# Patient Record
Sex: FEMALE | URBAN - METROPOLITAN AREA
[De-identification: names, ages, dates, MRNs, and addresses within clinical notes are randomized per-mention and may not be internally consistent; named-entity substitution may affect disease eponyms.]

---

## 2021-12-01 ENCOUNTER — OFFICE VISIT (OUTPATIENT)
Dept: URBAN - METROPOLITAN AREA CLINIC 56 | Facility: CLINIC | Age: 66
End: 2021-12-01
Payer: MEDICARE

## 2021-12-01 DIAGNOSIS — H43.391 OTHER VITREOUS OPACITIES, RIGHT EYE: ICD-10-CM

## 2021-12-01 DIAGNOSIS — E11.3391 TYPE 2 DIAB W MODERATE NONPRLF DIAB RTNOP W/O MACULAR EDEMA, RIGHT EYE: ICD-10-CM

## 2021-12-01 DIAGNOSIS — H25.813 COMBINED FORMS OF AGE-RELATED CATARACT, BILATERAL: ICD-10-CM

## 2021-12-01 DIAGNOSIS — E11.3312 TYPE 2 DIAB W MODERATE NONPRLF DIAB RTNOP W MACULAR EDEMA, LEFT EYE: Primary | ICD-10-CM

## 2021-12-01 DIAGNOSIS — H43.812 VITREOUS DEGENERATION, LEFT EYE: ICD-10-CM

## 2021-12-01 DIAGNOSIS — H11.011 AMYLOID PTERYGIUM OF RIGHT EYE: ICD-10-CM

## 2021-12-01 DIAGNOSIS — Z79.84 LONG TERM (CURRENT) USE OF ORAL ANTIDIABETIC DRUGS: ICD-10-CM

## 2021-12-01 DIAGNOSIS — Z79.4 LONG TERM (CURRENT) USE OF INSULIN: ICD-10-CM

## 2021-12-01 PROCEDURE — 92134 CPTRZ OPH DX IMG PST SGM RTA: CPT | Performed by: OPTOMETRIST

## 2021-12-01 PROCEDURE — 92250 FUNDUS PHOTOGRAPHY W/I&R: CPT | Performed by: OPTOMETRIST

## 2021-12-01 PROCEDURE — 99204 OFFICE O/P NEW MOD 45 MIN: CPT | Performed by: OPTOMETRIST

## 2021-12-01 ASSESSMENT — VISUAL ACUITY
OS: 20/40
OD: 20/40

## 2021-12-01 ASSESSMENT — INTRAOCULAR PRESSURE
OD: 17
OS: 18

## 2021-12-01 ASSESSMENT — KERATOMETRY
OD: 44.46
OS: 44.67

## 2025-04-09 NOTE — IMPRESSION/PLAN
Impression: Amyloid pterygium of right eye: H11.011. Plan: Discussed findings and diagnosis. Recommended frequent artificial tears for lubrication and UV protected sunglasses. Monitor.
Impression: Combined forms of age-related cataract, bilateral: H25.813. Plan: Not visually significant. Observe until vision decreases.  Will re-evaluate at the next annual comprehensive eye exam.
Impression: Long term (current) use of insulin: Z79.4.  Plan: See plan B67.2318 and L02.8724
Impression: Long term (current) use of oral antidiabetic drugs: Z79.84.  Plan: See plan P56.7908 and M04.8928
Impression: Other vitreous opacities, right eye: H43.391. Plan: There is no evidence of retinal pathology. All signs and risks of retinal detachment and tears were discussed in detail. Patient instructed to call the office immediately if any symptoms noted.
Impression: Type 2 diab w moderate nonprlf diab rtnop w macular edema, left eye: D69.1559. Plan: Discussed today's findings with patient. Diabetic retinopathy was identified in today's exam especially in the Left eye with circinate exudate maculopathy. . Reviewed the importance of diet, exercise and monitoring blood glucose with patient. Fundus Photos were obtained today and recommend patient to return for next available retina consultation.
Impression: Type 2 diab w moderate nonprlf diab rtnop w/o macular edema, right eye: N48.5828.  Plan: See plan L48.0456
Impression: Vitreous degeneration, left eye: H43.812.  Plan: See plan H43.391
no